# Patient Record
Sex: MALE | Race: WHITE | NOT HISPANIC OR LATINO | URBAN - METROPOLITAN AREA
[De-identification: names, ages, dates, MRNs, and addresses within clinical notes are randomized per-mention and may not be internally consistent; named-entity substitution may affect disease eponyms.]

---

## 2017-08-07 PROBLEM — Z00.00 ENCOUNTER FOR PREVENTIVE HEALTH EXAMINATION: Status: ACTIVE | Noted: 2017-08-07

## 2017-08-29 ENCOUNTER — OUTPATIENT (OUTPATIENT)
Dept: OUTPATIENT SERVICES | Facility: HOSPITAL | Age: 27
LOS: 1 days | End: 2017-08-29

## 2017-08-29 ENCOUNTER — APPOINTMENT (OUTPATIENT)
Dept: MRI IMAGING | Facility: CLINIC | Age: 27
End: 2017-08-29
Payer: COMMERCIAL

## 2017-08-29 DIAGNOSIS — Z00.8 ENCOUNTER FOR OTHER GENERAL EXAMINATION: ICD-10-CM

## 2017-08-29 PROCEDURE — 70551 MRI BRAIN STEM W/O DYE: CPT | Mod: 26

## 2017-10-10 ENCOUNTER — APPOINTMENT (OUTPATIENT)
Dept: NEUROLOGY | Facility: CLINIC | Age: 27
End: 2017-10-10
Payer: COMMERCIAL

## 2017-10-10 VITALS
SYSTOLIC BLOOD PRESSURE: 130 MMHG | DIASTOLIC BLOOD PRESSURE: 86 MMHG | HEIGHT: 69 IN | WEIGHT: 145 LBS | BODY MASS INDEX: 21.48 KG/M2

## 2017-10-10 PROCEDURE — 99205 OFFICE O/P NEW HI 60 MIN: CPT

## 2017-10-10 PROCEDURE — 70551 MRI BRAIN STEM W/O DYE: CPT

## 2017-10-14 ENCOUNTER — APPOINTMENT (OUTPATIENT)
Dept: CT IMAGING | Facility: CLINIC | Age: 27
End: 2017-10-14
Payer: COMMERCIAL

## 2017-10-14 ENCOUNTER — OUTPATIENT (OUTPATIENT)
Dept: OUTPATIENT SERVICES | Facility: HOSPITAL | Age: 27
LOS: 1 days | End: 2017-10-14
Payer: COMMERCIAL

## 2017-10-14 DIAGNOSIS — Z00.8 ENCOUNTER FOR OTHER GENERAL EXAMINATION: ICD-10-CM

## 2017-10-14 PROCEDURE — 70486 CT MAXILLOFACIAL W/O DYE: CPT | Mod: 26

## 2017-10-14 PROCEDURE — 70486 CT MAXILLOFACIAL W/O DYE: CPT

## 2018-03-10 ENCOUNTER — EMERGENCY (EMERGENCY)
Facility: HOSPITAL | Age: 28
LOS: 1 days | Discharge: HOME | End: 2018-03-10
Attending: EMERGENCY MEDICINE | Admitting: EMERGENCY MEDICINE
Payer: COMMERCIAL

## 2018-03-10 VITALS
SYSTOLIC BLOOD PRESSURE: 142 MMHG | OXYGEN SATURATION: 100 % | RESPIRATION RATE: 14 BRPM | HEART RATE: 88 BPM | TEMPERATURE: 98 F | DIASTOLIC BLOOD PRESSURE: 75 MMHG

## 2018-03-10 DIAGNOSIS — G93.5 COMPRESSION OF BRAIN: ICD-10-CM

## 2018-03-10 LAB
ALBUMIN SERPL ELPH-MCNC: 4.6 G/DL — SIGNIFICANT CHANGE UP (ref 3.3–5)
ALP SERPL-CCNC: 67 U/L — SIGNIFICANT CHANGE UP (ref 40–120)
ALT FLD-CCNC: 9 U/L — SIGNIFICANT CHANGE UP (ref 4–41)
APTT BLD: 32.7 SEC — SIGNIFICANT CHANGE UP (ref 27.5–37.4)
AST SERPL-CCNC: 24 U/L — SIGNIFICANT CHANGE UP (ref 4–40)
BASOPHILS # BLD AUTO: 0.03 K/UL — SIGNIFICANT CHANGE UP (ref 0–0.2)
BASOPHILS NFR BLD AUTO: 0.4 % — SIGNIFICANT CHANGE UP (ref 0–2)
BILIRUB SERPL-MCNC: 0.4 MG/DL — SIGNIFICANT CHANGE UP (ref 0.2–1.2)
BUN SERPL-MCNC: 15 MG/DL — SIGNIFICANT CHANGE UP (ref 7–23)
CALCIUM SERPL-MCNC: 8.9 MG/DL — SIGNIFICANT CHANGE UP (ref 8.4–10.5)
CHLORIDE SERPL-SCNC: 105 MMOL/L — SIGNIFICANT CHANGE UP (ref 98–107)
CO2 SERPL-SCNC: 29 MMOL/L — SIGNIFICANT CHANGE UP (ref 22–31)
CREAT SERPL-MCNC: 0.88 MG/DL — SIGNIFICANT CHANGE UP (ref 0.5–1.3)
EOSINOPHIL # BLD AUTO: 0.17 K/UL — SIGNIFICANT CHANGE UP (ref 0–0.5)
EOSINOPHIL NFR BLD AUTO: 2.4 % — SIGNIFICANT CHANGE UP (ref 0–6)
GLUCOSE SERPL-MCNC: 92 MG/DL — SIGNIFICANT CHANGE UP (ref 70–99)
HCT VFR BLD CALC: 37.9 % — LOW (ref 39–50)
HGB BLD-MCNC: 13.6 G/DL — SIGNIFICANT CHANGE UP (ref 13–17)
IMM GRANULOCYTES # BLD AUTO: 0.02 # — SIGNIFICANT CHANGE UP
IMM GRANULOCYTES NFR BLD AUTO: 0.3 % — SIGNIFICANT CHANGE UP (ref 0–1.5)
INR BLD: 1.35 — HIGH (ref 0.88–1.17)
LYMPHOCYTES # BLD AUTO: 2.29 K/UL — SIGNIFICANT CHANGE UP (ref 1–3.3)
LYMPHOCYTES # BLD AUTO: 33 % — SIGNIFICANT CHANGE UP (ref 13–44)
MCHC RBC-ENTMCNC: 31.4 PG — SIGNIFICANT CHANGE UP (ref 27–34)
MCHC RBC-ENTMCNC: 35.9 % — SIGNIFICANT CHANGE UP (ref 32–36)
MCV RBC AUTO: 87.5 FL — SIGNIFICANT CHANGE UP (ref 80–100)
MONOCYTES # BLD AUTO: 0.47 K/UL — SIGNIFICANT CHANGE UP (ref 0–0.9)
MONOCYTES NFR BLD AUTO: 6.8 % — SIGNIFICANT CHANGE UP (ref 2–14)
NEUTROPHILS # BLD AUTO: 3.96 K/UL — SIGNIFICANT CHANGE UP (ref 1.8–7.4)
NEUTROPHILS NFR BLD AUTO: 57.1 % — SIGNIFICANT CHANGE UP (ref 43–77)
NRBC # FLD: 0 — SIGNIFICANT CHANGE UP
PLATELET # BLD AUTO: 184 K/UL — SIGNIFICANT CHANGE UP (ref 150–400)
PMV BLD: 10.4 FL — SIGNIFICANT CHANGE UP (ref 7–13)
POTASSIUM SERPL-MCNC: 3.8 MMOL/L — SIGNIFICANT CHANGE UP (ref 3.5–5.3)
POTASSIUM SERPL-SCNC: 3.8 MMOL/L — SIGNIFICANT CHANGE UP (ref 3.5–5.3)
PROT SERPL-MCNC: 6.9 G/DL — SIGNIFICANT CHANGE UP (ref 6–8.3)
PROTHROM AB SERPL-ACNC: 15.1 SEC — HIGH (ref 9.8–13.1)
RBC # BLD: 4.33 M/UL — SIGNIFICANT CHANGE UP (ref 4.2–5.8)
RBC # FLD: 12.2 % — SIGNIFICANT CHANGE UP (ref 10.3–14.5)
SODIUM SERPL-SCNC: 145 MMOL/L — SIGNIFICANT CHANGE UP (ref 135–145)
WBC # BLD: 6.94 K/UL — SIGNIFICANT CHANGE UP (ref 3.8–10.5)
WBC # FLD AUTO: 6.94 K/UL — SIGNIFICANT CHANGE UP (ref 3.8–10.5)

## 2018-03-10 PROCEDURE — 70450 CT HEAD/BRAIN W/O DYE: CPT | Mod: 26

## 2018-03-10 PROCEDURE — 71045 X-RAY EXAM CHEST 1 VIEW: CPT | Mod: 26

## 2018-03-10 PROCEDURE — 99220: CPT

## 2018-03-10 PROCEDURE — 72156 MRI NECK SPINE W/O & W/DYE: CPT | Mod: 26

## 2018-03-10 PROCEDURE — 70553 MRI BRAIN STEM W/O & W/DYE: CPT | Mod: 26

## 2018-03-10 PROCEDURE — 99281 EMR DPT VST MAYX REQ PHY/QHP: CPT

## 2018-03-10 NOTE — ED PROVIDER NOTE - ATTENDING CONTRIBUTION TO CARE
26yo M PMH: Chiari I malformation dx this past summer referred to ED for MRI brain by neurosurgery. Patient reports intermittent  left-sided paresthesias LUE > LLE, now with new tongue tingling sensation, discussed with his neurosurgeon and referred for evaluation MRI/neurology consult, possible acute stroke.   On exam awake & alert, NAD., PERRL, ?horizontal nystagmus, mmm, lungs CTAB, RRR, 2+ pulses b/l, neuro A&Ox3, no focal deficits, skin warm and dry no rash

## 2018-03-10 NOTE — CONSULT NOTE ADULT - SUBJECTIVE AND OBJECTIVE BOX
Neurology Consult    Name: MAGDALENE ALDANA    HPI: 26 yo man who presents to [facility] on referral by his private neurosurgeon for new onset tongue tingling, which started today. Patient has had intermittent left UE tingling since August 2017, when he was diagnosed w/ Chiari 1 Malformation (last MRI 8/17: 8mm below ramsey magnum). As per house neurosurgery and ED, patient's private neurologist requested patient repeat MRI head to monitor progression of the Chiari Malformation as well as a MRI C-spine given patient's new onset symptoms. CSF studies have also been requested. Neurology team has been consulted for any further recommendations regarding symptomatology, including any other differential diagnosis.      PMH/PSH:   Chiari I malformation    MEDICATIONS  (home)    Allergies: No Known Allergies    Objective:   Vital Signs Last 24 Hrs  T(C): 36.6 (10 Mar 2018 16:44), Max: 36.6 (10 Mar 2018 16:44)  T(F): 97.8 (10 Mar 2018 16:44), Max: 97.8 (10 Mar 2018 16:44)  HR: 88 (10 Mar 2018 16:44) (88 - 88)  BP: 142/75 (10 Mar 2018 16:44) (142/75 - 142/75)  RR: 14 (10 Mar 2018 16:44) (14 - 14)  SpO2: 100% (10 Mar 2018 16:44) (100% - 100%)    General Exam:   General appearance: No acute distress                   Neurological Exam:  Mental Status: AAOx3, fluent speech, follows commands    Cranial Nerves: EOMI, PERRL, V1-V3 intact, facial symmetry intact, no dysarthria, tongue midline, VFF    Motor: 5/5 throughout. No drift x4    Sensation: Intact to LT throughout    Coordination: FTN intact b/l    Reflexes: 1+ bilateral biceps, brachioradialis, patellar and ankle    Gait: normal and stable.      Labs:  CBC Full  -  ( 10 Mar 2018 19:35 )  WBC Count : 6.94 K/uL  Hemoglobin : 13.6 g/dL  Hematocrit : 37.9 %  Platelet Count - Automated : 184 K/uL  Mean Cell Volume : 87.5 fL  Mean Cell Hemoglobin : 31.4 pg  Mean Cell Hemoglobin Concentration : 35.9 %  Auto Neutrophil # : 3.96 K/uL  Auto Lymphocyte # : 2.29 K/uL  Auto Monocyte # : 0.47 K/uL  Auto Eosinophil # : 0.17 K/uL  Auto Basophil # : 0.03 K/uL  Auto Neutrophil % : 57.1 %  Auto Lymphocyte % : 33.0 %  Auto Monocyte % : 6.8 %  Auto Eosinophil % : 2.4 %  Auto Basophil % : 0.4 %    Radiology Neurology Consult    Name: MAGDALENE ALDANA    HPI: 26 yo man who presents to [facility] on referral by his private neurosurgeon for new onset tongue tingling, which started today. Patient has had intermittent left UE tingling since August 2017, when he was diagnosed w/ Chiari 1 Malformation (last MRI 8/17: 8mm below ramsey magnum). As per house neurosurgery and ED, patient's private neurologist requested patient repeat MRI head to monitor progression of the Chiari Malformation as well as a MRI C-spine given patient's new onset symptoms. CSF studies have also been requested. Neurology team has been consulted for any further recommendations regarding symptomatology, including any other differential diagnosis.      PMH/PSH:   Chiari I malformation    MEDICATIONS  (home)    Allergies: No Known Allergies    Objective:   Vital Signs Last 24 Hrs  T(C): 36.6 (10 Mar 2018 16:44), Max: 36.6 (10 Mar 2018 16:44)  T(F): 97.8 (10 Mar 2018 16:44), Max: 97.8 (10 Mar 2018 16:44)  HR: 88 (10 Mar 2018 16:44) (88 - 88)  BP: 142/75 (10 Mar 2018 16:44) (142/75 - 142/75)  RR: 14 (10 Mar 2018 16:44) (14 - 14)  SpO2: 100% (10 Mar 2018 16:44) (100% - 100%)    General Exam:   General appearance: No acute distress                   Neurological Exam:  Mental Status: AAOx3, fluent speech, follows commands    Cranial Nerves: EOMI, PERRL, V1-V3 intact, facial symmetry intact, no dysarthria, tongue midline, VFF    Motor: 5/5 throughout. No drift x4    Sensation: Intact to LT throughout    Coordination: FTN intact b/l    Reflexes: 1+ bilateral biceps, brachioradialis, patellar and ankle    Gait: normal and stable.      Labs:  CBC Full  -  ( 10 Mar 2018 19:35 )  WBC Count : 6.94 K/uL  Hemoglobin : 13.6 g/dL  Hematocrit : 37.9 %  Platelet Count - Automated : 184 K/uL  Mean Cell Volume : 87.5 fL  Mean Cell Hemoglobin : 31.4 pg  Mean Cell Hemoglobin Concentration : 35.9 %  Auto Neutrophil # : 3.96 K/uL  Auto Lymphocyte # : 2.29 K/uL  Auto Monocyte # : 0.47 K/uL  Auto Eosinophil # : 0.17 K/uL  Auto Basophil # : 0.03 K/uL  Auto Neutrophil % : 57.1 %  Auto Lymphocyte % : 33.0 %  Auto Monocyte % : 6.8 %  Auto Eosinophil % : 2.4 %  Auto Basophil % : 0.4 %    Radiology  < from: CT Head No Cont (03.10.18 @ 20:25) >  IMPRESSION:  Unchanged inferior displacement of the cerebellar tonsils below the   foramen magnum compatible with patient's history of Chiari I malformation.    No acute intracranial hemorrhage, mass effect, or CT evidence of an acute   large vascular territorial infarct. MRI is more sensitive in detecting   early changes of ischemia if clinically indicated and there are no   contraindications.    < end of copied text > Neurology Consult    Name: MAGDALENE ALDANA    HPI: 28 yo man who presents to [facility] on referral by his private neurosurgeon for new onset tongue tingling, which started today. Patient has had intermittent left UE tingling since August 2017, when he was diagnosed w/ Chiari 1 Malformation (last MRI 8/17: 8mm below ramsey magnum). As per house neurosurgery and ED, patient's private neurologist requested patient repeat MRI head to monitor progression of the Chiari Malformation as well as a MRI C-spine given patient's new onset symptoms. CSF studies have also been requested. Neurology team has been consulted for any further recommendations regarding symptomatology, including any other differential diagnosis.      PMH/PSH:   Chiari I malformation    MEDICATIONS  (home)    Allergies: No Known Allergies    Objective:   Vital Signs Last 24 Hrs  T(C): 36.6 (10 Mar 2018 16:44), Max: 36.6 (10 Mar 2018 16:44)  T(F): 97.8 (10 Mar 2018 16:44), Max: 97.8 (10 Mar 2018 16:44)  HR: 88 (10 Mar 2018 16:44) (88 - 88)  BP: 142/75 (10 Mar 2018 16:44) (142/75 - 142/75)  RR: 14 (10 Mar 2018 16:44) (14 - 14)  SpO2: 100% (10 Mar 2018 16:44) (100% - 100%)    General Exam:   General appearance: No acute distress                   Neurological Exam:  Mental Status: AAOx3, fluent speech, follows commands    Cranial Nerves: EOMI, PERRL, V1-V3 intact, facial symmetry intact, no dysarthria, tongue midline, VFF    Motor: 5/5 throughout. No drift x4    Sensation: Intact to LT/pinprick throughout    Coordination: FTN intact b/l    Reflexes: 1+ bilateral biceps, brachioradialis, patellar and ankle. Babinski absent b/l (toes downgoing)     Gait: normal and stable.      Labs:  CBC Full  -  ( 10 Mar 2018 19:35 )  WBC Count : 6.94 K/uL  Hemoglobin : 13.6 g/dL  Hematocrit : 37.9 %  Platelet Count - Automated : 184 K/uL  Mean Cell Volume : 87.5 fL  Mean Cell Hemoglobin : 31.4 pg  Mean Cell Hemoglobin Concentration : 35.9 %  Auto Neutrophil # : 3.96 K/uL  Auto Lymphocyte # : 2.29 K/uL  Auto Monocyte # : 0.47 K/uL  Auto Eosinophil # : 0.17 K/uL  Auto Basophil # : 0.03 K/uL  Auto Neutrophil % : 57.1 %  Auto Lymphocyte % : 33.0 %  Auto Monocyte % : 6.8 %  Auto Eosinophil % : 2.4 %  Auto Basophil % : 0.4 %    Radiology  < from: CT Head No Cont (03.10.18 @ 20:25) >  IMPRESSION:  Unchanged inferior displacement of the cerebellar tonsils below the   foramen magnum compatible with patient's history of Chiari I malformation.    No acute intracranial hemorrhage, mass effect, or CT evidence of an acute   large vascular territorial infarct. MRI is more sensitive in detecting   early changes of ischemia if clinically indicated and there are no   contraindications.    < end of copied text > Neurology Consult    Name: MAGDALENE ALDANA    HPI: 26 yo man who presents to [facility] on referral by his private neurosurgeon for new onset tongue tingling, which started today. Patient has had intermittent left UE tingling since August 2017, when he was diagnosed w/ Chiari 1 Malformation (last MRI 8/17: 8mm below ramsey magnum). As per house neurosurgery and ED, patient's private neurologist requested patient repeat MRI head to monitor progression of the Chiari Malformation as well as a MRI C-spine given patient's new onset symptoms. CSF studies have also been requested. Neurology team has been consulted for any further recommendations regarding symptomatology, including any other differential diagnosis.      PMH/PSH:   Chiari I malformation  Sinusitis s/p surgery     MEDICATIONS  (home)    Allergies: No Known Allergies    Objective:   Vital Signs Last 24 Hrs  T(C): 36.6 (10 Mar 2018 16:44), Max: 36.6 (10 Mar 2018 16:44)  T(F): 97.8 (10 Mar 2018 16:44), Max: 97.8 (10 Mar 2018 16:44)  HR: 88 (10 Mar 2018 16:44) (88 - 88)  BP: 142/75 (10 Mar 2018 16:44) (142/75 - 142/75)  RR: 14 (10 Mar 2018 16:44) (14 - 14)  SpO2: 100% (10 Mar 2018 16:44) (100% - 100%)    General Exam:   General appearance: No acute distress                   Neurological Exam:  Mental Status: AAOx3, fluent speech, follows commands    Cranial Nerves: EOMI, PERRL, V1-V3 intact, facial symmetry intact, no dysarthria, tongue midline, VFF    Motor: 5/5 throughout. No drift x4    Sensation: Intact to LT/pinprick throughout    Coordination: FTN intact b/l    Reflexes: 1+ bilateral biceps, brachioradialis, patellar and ankle. Babinski absent b/l (toes downgoing)     Gait: normal and stable.      Labs:  CBC Full  -  ( 10 Mar 2018 19:35 )  WBC Count : 6.94 K/uL  Hemoglobin : 13.6 g/dL  Hematocrit : 37.9 %  Platelet Count - Automated : 184 K/uL  Mean Cell Volume : 87.5 fL  Mean Cell Hemoglobin : 31.4 pg  Mean Cell Hemoglobin Concentration : 35.9 %  Auto Neutrophil # : 3.96 K/uL  Auto Lymphocyte # : 2.29 K/uL  Auto Monocyte # : 0.47 K/uL  Auto Eosinophil # : 0.17 K/uL  Auto Basophil # : 0.03 K/uL  Auto Neutrophil % : 57.1 %  Auto Lymphocyte % : 33.0 %  Auto Monocyte % : 6.8 %  Auto Eosinophil % : 2.4 %  Auto Basophil % : 0.4 %    Radiology  < from: CT Head No Cont (03.10.18 @ 20:25) >  IMPRESSION:  Unchanged inferior displacement of the cerebellar tonsils below the   foramen magnum compatible with patient's history of Chiari I malformation.    No acute intracranial hemorrhage, mass effect, or CT evidence of an acute   large vascular territorial infarct. MRI is more sensitive in detecting   early changes of ischemia if clinically indicated and there are no   contraindications.    < end of copied text >

## 2018-03-10 NOTE — ED PROVIDER NOTE - MEDICAL DECISION MAKING DETAILS
27m w Chiari I here for chronic left-sided paresthesias w new tongue paresthesias. Also b/l horiz nystagmus and abnl Romberg. Will CTH; call neuro who sent him here for collateral; reassess

## 2018-03-10 NOTE — CONSULT NOTE ADULT - SUBJECTIVE AND OBJECTIVE BOX
26 YO male with Chiari I malformation dx this past summer here today for left-sided paresthesias. Says feels mostly in left upper extremity, rarely LLE. Has felt same thing intermittently since the summer. Today also started to feel paresthesias of tongue.Has intermittent pain in left occiput but no new headache today. Occasional intermittent blurry vision in last few mos but none today. No fever, no neck pain, no recent illness.    WDWN male in NAD  Vital Signs Last 24 Hrs  T(C): 36.6 (10 Mar 2018 16:44), Max: 36.6 (10 Mar 2018 16:44)  T(F): 97.8 (10 Mar 2018 16:44), Max: 97.8 (10 Mar 2018 16:44)  HR: 88 (10 Mar 2018 16:44) (88 - 88)  BP: 142/75 (10 Mar 2018 16:44) (142/75 - 142/75)  BP(mean): --  RR: 14 (10 Mar 2018 16:44) (14 - 14)  SpO2: 100% (10 Mar 2018 16:44) (100% - 100%)    AAO X 3  PERRLA, EOMI  CN 2-12 grossly intact  JOHNSON strength 5/5  DTR 2+ Bilaterally  SILT

## 2018-03-10 NOTE — ED ADULT TRIAGE NOTE - CHIEF COMPLAINT QUOTE
Pt presents with c/o tingling on the left side of his body and the back of his head r/t Chiari malformation. Denies any other complaints.

## 2018-03-10 NOTE — ED PROVIDER NOTE - PROGRESS NOTE DETAILS
Elizabeth Goldberger PGY-1: called pt's neurosurg, Kenrick Suazo, awaiting call back Elizabeth Goldberger PGY-1: d/w Dr. Suazo, who said these sx are new (not chronic), concerned for stroke sx. Requesting neuro consult; sending his neurosurg PA to see pt; would like MR head and neck Elizabeth Goldberger PGY-1: called neuro Milton att: 20:45 Patient signed out to me. 27M h/o Arnold Chiari Malformation h/o itnermitt lue tingling p/w tingling. CT pending, Nsx eval underway, plan if CT non con head negative place in CDU for MRI. Milton att: CT unchanged. Nsx JONNY Donovan aware and amenable CDU for MRI and CSF Study. Nsx notified patient and patient's family they will stay overnight in CDU for addl test. CDU JONNY Tan aware and amenable with CDU accept.

## 2018-03-10 NOTE — CONSULT NOTE ADULT - PROBLEM SELECTOR RECOMMENDATION 9
Plan:  -c/w neuro surgery recommendations as planned (MRI brain, MRI C spine)   -recommend gabapentin 100mg TID for paresthesias   -patient to follow up outpatient neurosurgery for long term management   -further recommendations pending results of imaging and CSFs Patient is currently asymptomatic. Unlikely related to ischemic etiology, possibly although unlikely seizure related, most likely cervical pathology possibly correlating to patient known hx of Chiari I Malformation.   Plan:  -c/w neuro surgery recommendations as planned (MRI brain, MRI C spine)   -recommend gabapentin 100mg TID for paresthesias PRN   -patient to follow up outpatient neurosurgery for long term management   -further recommendations pending results of imaging and CSFs

## 2018-03-10 NOTE — ED PROVIDER NOTE - OBJECTIVE STATEMENT
27m w Chiari I malformation dx this past summer here today for left-sided paresthesias. Says feels mostly in left upper extremity, rarely LLE. Has felt same thing intermittently since the summer. Today also started to feel paresthesias of tongue. Called neurosurg and states that he was told to visit ED "because likely due for another MRI". Has intermittent pain in left occiput but no new headache today. Occasional intermittent blurry vision in last few mos but none today. No fever, no neck pain, no recent illness.

## 2018-03-10 NOTE — CONSULT NOTE ADULT - ASSESSMENT
26 yo man who presents to [facility] on referral by his private neurosurgeon for new onset tongue tingling, which started today. Patient has had intermittent left UE tingling since August 2017, when he was diagnosed w/ Chiari 1 Malformation (last MRI 8/17: 8mm below ramsey magnum). As per house neurosurgery and ED, patient's private neurologist requested patient repeat MRI head to monitor progression of the Chiari Malformation as well as a MRI C-spine given patient's new onset symptoms. CSF studies have also been requested. Neurology team has been consulted for any further recommendations regarding symptomatology, including any other differential diagnosis.

## 2018-03-11 VITALS
HEART RATE: 75 BPM | OXYGEN SATURATION: 97 % | TEMPERATURE: 98 F | RESPIRATION RATE: 14 BRPM | DIASTOLIC BLOOD PRESSURE: 63 MMHG | SYSTOLIC BLOOD PRESSURE: 116 MMHG

## 2018-03-11 LAB — HBA1C BLD-MCNC: 5.1 % — SIGNIFICANT CHANGE UP (ref 4–5.6)

## 2018-03-11 PROCEDURE — 99217: CPT

## 2018-03-11 NOTE — ED CDU PROVIDER DISPOSITION NOTE - ATTENDING CONTRIBUTION TO CARE
28yo M PMH: Chiari I malformation dx this past summer referred to ED for MRI brain by neurosurgery. Patient reports intermittent  left-sided paresthesias LUE > LLE, now with new tongue tingling sensation, discussed with his neurosurgeon and referred for evaluation MRI/neurology consult, possible acute stroke.   On exam awake & alert, NAD., PERRL, ?horizontal nystagmus, mmm, lungs CTAB, RRR, 2+ pulses b/l, neuro A&Ox3, no focal deficits, skin warm and dry no rash.  Evaluated by neurosurgery and neurology  MRI completed - no acute findings  Plan for d/c home today

## 2018-03-11 NOTE — ED CDU PROVIDER INITIAL DAY NOTE - PROGRESS NOTE DETAILS
JONNY Cain: pt NAD, VSS, no acute complaints. Ne events on Tele. Will continue to monitor. MRI - no acute infarcts. Will continue to monitor. Neurosurg in the AM.

## 2018-03-11 NOTE — ED ADULT NURSE NOTE - OBJECTIVE STATEMENT
pt on bed aox3 ambulatory c/o occipital pain since yesterday on and off, today with worsening numbness and tingling sensation on left arm and face.  Dx with Chiari malformation. denies dizziness headache CP SOB palpitation slurred speech blurry vision facial droop

## 2018-03-11 NOTE — ED CDU PROVIDER DISPOSITION NOTE - CLINICAL COURSE
-placed in observation for MRI/MRA head and neck  -evaluated by neurology and neurosurgery  -no acute grady changes during observation  -cleared for d/c home -placed in observation for MRI/MRA head and neck  -evaluated by neurology and neurosurgery  -no acute grady changes during observation  -cleared for d/c home by neurosurgery and neurology

## 2018-03-11 NOTE — ED CDU PROVIDER INITIAL DAY NOTE - ATTENDING CONTRIBUTION TO CARE
26yo M PMH: Chiari I malformation dx this past summer referred to ED for MRI brain by neurosurgery. Patient reports intermittent  left-sided paresthesias LUE > LLE, now with new tongue tingling sensation, discussed with his neurosurgeon and referred for evaluation MRI/neurology consult, possible acute stroke.   On exam awake & alert, NAD., PERRL, ?horizontal nystagmus, mmm, lungs CTAB, RRR, 2+ pulses b/l, neuro A&Ox3, no focal deficits, skin warm and dry no rash.  Evaluated by neurosurgery and neurology  MRI completed - no acute findings  Plan for d/c home today 26yo M PMH: Chiari I malformation dx this past summer referred to ED for MRI brain by neurosurgery. Patient reports intermittent  left-sided paresthesias LUE > LLE, now with new tongue tingling sensation, discussed with his neurosurgeon and referred for evaluation MRI/neurology consult, possible acute stroke.   On exam awake & alert, NAD., PERRL, mmm, lungs CTAB, RRR, 2+ pulses b/l, neuro A&Ox3, no focal deficits, skin warm and dry no rash.  Evaluated by neurosurgery and neurology  MRI completed - no acute findings  Plan for d/c home today

## 2018-03-11 NOTE — ED CDU PROVIDER SUBSEQUENT DAY NOTE - PROGRESS NOTE DETAILS
Patient states he is feeling better. No new complaints  Vs stable  Heart- RRR s1s2 nl Lungs - clear bilaterally abd - soft NT ND extrem - no edema or cyanosis  MRI completed this am. MRI head- Chiari 1 Malformation, MRI cervical Neck - negative. Plan for neurology follow up this am. Continue to monitor.

## 2018-03-11 NOTE — ED CDU PROVIDER INITIAL DAY NOTE - MEDICAL DECISION MAKING DETAILS
27m w Chiari I malformation dx this past summer here today for left upper extremity parasthesias and tongue parasthesias lasting for 15-20 minutes.  Plan: MRI head, neck, neurosurg am

## 2018-03-11 NOTE — ED CDU PROVIDER SUBSEQUENT DAY NOTE - ATTENDING CONTRIBUTION TO CARE
28yo M PMH: Chiari I malformation dx this past summer referred to ED for MRI brain by neurosurgery. Patient reports intermittent  left-sided paresthesias LUE > LLE, now with new tongue tingling sensation, discussed with his neurosurgeon and referred for evaluation MRI/neurology consult, possible acute stroke.   On exam awake & alert, NAD., PERRL, mmm, lungs CTAB, RRR, 2+ pulses b/l, neuro A&Ox3, no focal deficits, skin warm and dry no rash.  Evaluated by neurosurgery and neurology  MRI completed - no acute findings  Plan for d/c home today

## 2018-03-11 NOTE — ED CDU PROVIDER SUBSEQUENT DAY NOTE - HISTORY
Patient is a 27 year old male who presents with  left upper extremity paresthesias.. States it lasted 20 minutes. Also with paresthesias of tongue. Patient with a history of Chiari malformation.  Presents now for evaluation after consulting with PMD Dr Asencio.

## 2018-03-11 NOTE — ED CDU PROVIDER INITIAL DAY NOTE - OBJECTIVE STATEMENT
27m w Chiari I malformation dx this past summer here today for left upper extremity parasthesias and tongue parasthesias lasting for 15-20 minutes. Called neurosurgeon Dr. Asencio and wantd him to come in for an MRI. Endorsing intermittent diplopia for the past 2-3 weeks. Denies head trauma, LOC, n/v/f/c, CP, SOB, abdominal pain, dysuria, hematuria, melena, hematochezia. In the ED pt had CT - WNL sent to CDU for MRI brain and spine. Neurosurg following pt.

## 2019-06-11 PROBLEM — G93.5 COMPRESSION OF BRAIN: Chronic | Status: ACTIVE | Noted: 2018-03-10

## 2019-07-09 ENCOUNTER — APPOINTMENT (OUTPATIENT)
Dept: UROLOGY | Facility: CLINIC | Age: 29
End: 2019-07-09
Payer: COMMERCIAL

## 2019-07-09 VITALS
BODY MASS INDEX: 22.22 KG/M2 | SYSTOLIC BLOOD PRESSURE: 115 MMHG | RESPIRATION RATE: 16 BRPM | HEIGHT: 69 IN | TEMPERATURE: 98.1 F | HEART RATE: 80 BPM | WEIGHT: 150 LBS | DIASTOLIC BLOOD PRESSURE: 78 MMHG

## 2019-07-09 DIAGNOSIS — I86.1 SCROTAL VARICES: ICD-10-CM

## 2019-07-09 PROCEDURE — 99203 OFFICE O/P NEW LOW 30 MIN: CPT

## 2019-07-11 ENCOUNTER — TRANSCRIPTION ENCOUNTER (OUTPATIENT)
Age: 29
End: 2019-07-11

## 2019-07-24 NOTE — PHYSICAL EXAM
[General Appearance - Well Nourished] : well nourished [General Appearance - Well Developed] : well developed [Normal Appearance] : normal appearance [Well Groomed] : well groomed [Edema] : no peripheral edema [General Appearance - In No Acute Distress] : no acute distress [Respiration, Rhythm And Depth] : normal respiratory rhythm and effort [Exaggerated Use Of Accessory Muscles For Inspiration] : no accessory muscle use [Abdomen Soft] : soft [Urethral Meatus] : meatus normal [Abdomen Tenderness] : non-tender [Penis Abnormality] : normal circumcised penis [Urinary Bladder Findings] : the bladder was normal on palpation [Scrotum] : the scrotum was normal [Epididymis] : the epididymides were normal [Testes Tenderness] : no tenderness of the testes [] : no rash [No Focal Deficits] : no focal deficits [Sensation] : the sensory exam was normal to light touch and pinprick [Oriented To Time, Place, And Person] : oriented to person, place, and time [Affect] : the affect was normal [Mood] : the mood was normal [Not Anxious] : not anxious [No Palpable Adenopathy] : no palpable adenopathy [FreeTextEntry1] : Left varicocele noted on exam

## 2019-07-24 NOTE — HISTORY OF PRESENT ILLNESS
[FreeTextEntry1] : Patient present for primary evaluation of Left Varicocele.\par He has noted a left Varicocele for the past year that has some intermittent dull aching pain.  \par Seen prior by another urologist that treated him for epididymitis and obtained and US of testis that noted a left Varicocele.\par \par He denies any  trauma or issues.  No family history of  malignances. Denies Hematuria, dysuria, flank pain, fever, and chills.

## 2019-07-24 NOTE — ASSESSMENT
[FreeTextEntry1] : Left Varicocele with prior US at [facility].\par \par Reassured patient and instructed to wear tight fitting undergarments and OTC pain medication for dull ache and if he would like further evaluation for fertility or discuss surgical options gave information to be seen by Dr Ortiz. \par \par Follow up PRN

## 2019-12-14 ENCOUNTER — APPOINTMENT (OUTPATIENT)
Dept: MRI IMAGING | Facility: IMAGING CENTER | Age: 29
End: 2019-12-14
Payer: COMMERCIAL

## 2019-12-14 ENCOUNTER — OUTPATIENT (OUTPATIENT)
Dept: OUTPATIENT SERVICES | Facility: HOSPITAL | Age: 29
LOS: 1 days | End: 2019-12-14
Payer: COMMERCIAL

## 2019-12-14 DIAGNOSIS — Z00.8 ENCOUNTER FOR OTHER GENERAL EXAMINATION: ICD-10-CM

## 2019-12-14 PROCEDURE — 70553 MRI BRAIN STEM W/O & W/DYE: CPT

## 2019-12-14 PROCEDURE — A9585: CPT

## 2019-12-14 PROCEDURE — 70553 MRI BRAIN STEM W/O & W/DYE: CPT | Mod: 26

## 2020-01-14 ENCOUNTER — OUTPATIENT (OUTPATIENT)
Dept: OUTPATIENT SERVICES | Facility: HOSPITAL | Age: 30
LOS: 1 days | End: 2020-01-14
Payer: COMMERCIAL

## 2020-01-14 ENCOUNTER — APPOINTMENT (OUTPATIENT)
Age: 30
End: 2020-01-14
Payer: COMMERCIAL

## 2020-01-14 DIAGNOSIS — Z00.8 ENCOUNTER FOR OTHER GENERAL EXAMINATION: ICD-10-CM

## 2020-01-14 PROCEDURE — 70486 CT MAXILLOFACIAL W/O DYE: CPT | Mod: 26

## 2020-01-14 PROCEDURE — 70486 CT MAXILLOFACIAL W/O DYE: CPT

## 2020-03-11 ENCOUNTER — APPOINTMENT (OUTPATIENT)
Dept: OTOLARYNGOLOGY | Facility: CLINIC | Age: 30
End: 2020-03-11
Payer: COMMERCIAL

## 2020-03-11 VITALS
SYSTOLIC BLOOD PRESSURE: 115 MMHG | HEART RATE: 62 BPM | WEIGHT: 150 LBS | DIASTOLIC BLOOD PRESSURE: 73 MMHG | HEIGHT: 69 IN | BODY MASS INDEX: 22.22 KG/M2

## 2020-03-11 PROCEDURE — 99204 OFFICE O/P NEW MOD 45 MIN: CPT | Mod: 25

## 2020-03-11 PROCEDURE — 31231 NASAL ENDOSCOPY DX: CPT

## 2020-03-11 NOTE — REVIEW OF SYSTEMS
[Post Nasal Drip] : post nasal drip [Dizziness] : dizziness [Vertigo] : vertigo [Lightheadedness] : lightheadedness [Recurrent Sinus Infections] : recurrent sinus infections [Sinus Pain] : sinus pain [Sinus Pressure] : sinus pressure [Negative] : Heme/Lymph

## 2020-03-11 NOTE — REASON FOR VISIT
[Initial Consultation] : an initial consultation for [FreeTextEntry2] : referred by Dr. Kenrick Suazo, Neurologist, for recurrent headaches, CT and MRI findings

## 2020-03-11 NOTE — HISTORY OF PRESENT ILLNESS
[de-identified] : 29 year old male referred by Dr. Kenrick Suazo, Neurologist, for recurrent headaches, CT and MRI findings.  States went to Dr. Suazo for recurrent headaches. Denies nasal congestion, sinus pain, sinus pressure, post nasal drip, nasal discharge.  Denies sinus infections in the past year.   History of Chiari malformation, no treatment required.  MRI 12/14/19 impression tonsillar ectopia with the cerebellar tonsils lying approximately a centimeter below the foramen magnum consistent with a Chiari I malformation.  no hydrocephalus.  No evidence of tectal lesion.  Prominent nasopharyngeal lymphoid tissue at the level of the adenoids which is seen on the prior 10/29/19 as well but not seen 3/10/18.  \par CT sinuses 1/14/2020 impression: moderate to severe nonspecific enlargement of the adenoids of the nasopharynx is noted, grossly unchanged compared to the 12/14/19 brain MRI study.  The adenoids are substantially larger in size compared to the 2017 sinus CT study.  Correlate for adenoidal hypertrophy versus a nasopharyngeal mass. Diffuse paranasal sinus opacification is present as described most consistent with sinusitis.  Nonspecific soft tissue involves both nasal cavities; correlate for possible nasal polyposis.

## 2020-03-11 NOTE — CONSULT LETTER
[Dear  ___] : Dear  [unfilled], [Consult Letter:] : I had the pleasure of evaluating your patient, [unfilled]. [Please see my note below.] : Please see my note below. [Consult Closing:] : Thank you very much for allowing me to participate in the care of this patient.  If you have any questions, please do not hesitate to contact me. [Sincerely,] : Sincerely, [FreeTextEntry3] : Inocencio Hamilton MD, AURELIA, FACS\par  Department Otolaryngology\par Director of [facility]\par Professor of Otolaryngology, \par [employer] Cordero/[facility] [employer]\par

## 2020-03-11 NOTE — PROCEDURE
[Image(s) Captured] : image(s) captured and filed [Topical Lidocaine] : topical lidocaine [Oxymetazoline HCl] : oxymetazoline HCl [Flexible Endoscope] : examined with the flexible endoscope [Serial Number: ___] : Serial Number: [unfilled] [Recalcitrant Symptoms] : recalcitrant symptoms  [Anatomical Abnormality] : anatomical abnormality [Anterior rhinoscopy insufficient to account for symptoms] : anterior rhinoscopy insufficient to account for symptoms [FreeTextEntry6] : Pre-op indication(s): Headache, \par Post-op indication(s): Deviated septum chronic sinusitis\par Verbal consent obtained from patient.\par “Anterior rhinoscopy insufficient to account for symptoms” \par Details for procedure: \par Scope #: 129\par Type of scope:    flexible fiber optic telescope X    Rigid glass telescope \par Anesthesia and/or vasoconstriction was achieved topically by using: \par 4% Lidocaine spray   0.05% Oxymetazoline     Other ______ \par The following anatomic sites were directly examined in a sequential fashion: \par The scope was introduced in the nasal passage between the middle and inferior turbinates to exam the inferior portion of the middle meatus and the fontanelle, as well as the maxillary ostia. Next, the scope was passed medially and posteriorly to the middle turbinates to examine the sphenoethmoid recess and the superior turbinate region. \par Upon visualization the finders are as follows: \par Nasal Septum:   Normal    Deviated to   left Superiorly   right Septum closing off the posterior one half the nose and drainage catheter  \par Bleeding site cauterized:    Anterior   left   right   Posterior   left   right \par Method:   Silver Nitrate   YAG Laser    Electrocautery ______ \par Right Side: \par * Mucosa: Normal\par * Mucous: Normal\par * Polyp: Normal\par * Inferior Turbinate: Normal\par * Middle Turbinate: Polypoid and congestive\par * Superior Turbinate: Normal\par * Inferior Meatus: Normal\par * Middle Meatus: Normal\par * Super Meatus: Normal\par * Sphenoethmoidal Recess: Normal\par Left Side: \par * Mucosa: Normal\par * Mucous: Normal\par * Polyp: Normal\par * Inferior Turbinate: Normal\par * Middle Turbinate: Polypoid and thickened\par * Superior Turbinate: Normal\par * Inferior Meatus: Normal\par * Middle Meatus: closed\par * Super Meatus: Normal\par * Sphenoethmoidal Recess: Normal\par The patient tolerated the procedure well without any complications.\par \par \par

## 2020-05-06 ENCOUNTER — APPOINTMENT (OUTPATIENT)
Dept: OTOLARYNGOLOGY | Facility: CLINIC | Age: 30
End: 2020-05-06
Payer: COMMERCIAL

## 2020-05-06 VITALS
SYSTOLIC BLOOD PRESSURE: 106 MMHG | HEART RATE: 76 BPM | WEIGHT: 150 LBS | BODY MASS INDEX: 22.22 KG/M2 | HEIGHT: 69 IN | DIASTOLIC BLOOD PRESSURE: 71 MMHG | TEMPERATURE: 98 F

## 2020-05-06 VITALS
SYSTOLIC BLOOD PRESSURE: 125 MMHG | HEIGHT: 69 IN | TEMPERATURE: 98.3 F | HEART RATE: 85 BPM | BODY MASS INDEX: 22.22 KG/M2 | WEIGHT: 150 LBS | DIASTOLIC BLOOD PRESSURE: 75 MMHG

## 2020-05-06 DIAGNOSIS — R04.0 EPISTAXIS: ICD-10-CM

## 2020-05-06 PROCEDURE — 99215 OFFICE O/P EST HI 40 MIN: CPT | Mod: 25

## 2020-05-06 PROCEDURE — 31231 NASAL ENDOSCOPY DX: CPT

## 2020-05-06 NOTE — HISTORY OF PRESENT ILLNESS
[de-identified] : 29 year old male presents to office with nasal irritation. Pt has history of recent cold, treated with antibiotics by PCP. Pt went to an urgent care center because symptom had worsened was COVID tested ( negative results) with nasal swab which cause severe nasal irritation and moderate bleeding, pt was cough up blood. All symptoms have no resolved however pt would like to have nasal cavity is now okay before up coming surgery 5/18/20. Pt continues to use nasal saline prn. [FreeTextEntry2] : Bloody discharge

## 2020-05-06 NOTE — PHYSICAL EXAM
[Nasal Endoscopy Performed] : nasal endoscopy was performed, see procedure section for findings [] : septum deviated bilaterally [de-identified] : swollen [de-identified] : Posterior purulent [Midline] : trachea located in midline position [Normal] : no rashes

## 2020-05-06 NOTE — PROCEDURE
[Image(s) Captured] : image(s) captured and filed [Topical Lidocaine] : topical lidocaine [Oxymetazoline HCl] : oxymetazoline HCl [Flexible Endoscope] : examined with the flexible endoscope [Serial Number: ___] : Serial Number: [unfilled] [Recalcitrant Symptoms] : recalcitrant symptoms  [Anatomical Abnormality] : anatomical abnormality [Epistaxis] : evaluation of epistaxis [Anterior rhinoscopy insufficient to account for symptoms] : anterior rhinoscopy insufficient to account for symptoms [Congested] : congested [Jorge] : jorge [___ % Obstructed] : [unfilled]U% obstructed [S-Shaped Deviated] : S-shape deviation [Nasal Septum] : no lesions [Nasal Septum Mucosa Bleeding] : no bleeding [FreeTextEntry6] : Pre-op indication(s): Epistaxis\par Post-op indication(s): Deviated septum and nasopharyngeal tissue\par Verbal consent obtained from patient.\par “Anterior rhinoscopy insufficient to account for symptoms” \par Details for procedure: \par Scope #: 118\par Type of scope:    flexible fiber optic telescope  X   Rigid glass telescope \par Anesthesia and/or vasoconstriction was achieved topically by using: \par 4% Lidocaine spray   0.05% Oxymetazoline     Other ______ \par The following anatomic sites were directly examined in a sequential fashion: \par The scope was introduced in the nasal passage between the middle and inferior turbinates to exam the inferior portion of the middle meatus and the fontanelle, as well as the maxillary ostia. Next, the scope was passed medially and posteriorly to the middle turbinates to examine the sphenoethmoid recess and the superior turbinate region. \par Upon visualization the finders are as follows: \par Nasal Septum:     Deviated to   left  &  right  \par Bleeding site cauterized:    Anterior   left   right   Posterior   left   right \par Method:   Silver Nitrate   YAG Laser    Electrocautery ______ \par Right Side: \par * Mucosa: Normal\par * Mucous: Normal\par * Polyp: Normal\par * Inferior Turbinate: Normal\par * Middle Turbinate: Normal\par * Superior Turbinate: Normal\par * Inferior Meatus: Normal\par * Middle Meatus: closed\par * Super Meatus: Normal\par * Sphenoethmoidal Recess: swollen tissue\par Left Side: \par * Mucosa: Normal\par * Mucous: Normal\par * Polyp: Normal\par * Inferior Turbinate: Normal\par * Middle Turbinate: Normal\par * Superior Turbinate: Normal\par * Inferior Meatus: Normal\par * Middle Meatus: middle meatus\par * Super Meatus: Normal\par * Sphenoethmoidal Recess: swollen tissue\par The patient tolerated the procedure well without any complications.\par \par \par

## 2020-06-24 ENCOUNTER — APPOINTMENT (OUTPATIENT)
Dept: OTOLARYNGOLOGY | Facility: CLINIC | Age: 30
End: 2020-06-24

## 2020-06-29 DIAGNOSIS — Z01.818 ENCOUNTER FOR OTHER PREPROCEDURAL EXAMINATION: ICD-10-CM

## 2020-06-30 ENCOUNTER — APPOINTMENT (OUTPATIENT)
Dept: DISASTER EMERGENCY | Facility: CLINIC | Age: 30
End: 2020-06-30

## 2020-07-01 LAB — SARS-COV-2 N GENE NPH QL NAA+PROBE: NOT DETECTED

## 2020-07-02 ENCOUNTER — APPOINTMENT (OUTPATIENT)
Dept: OTOLARYNGOLOGY | Facility: AMBULATORY SURGERY CENTER | Age: 30
End: 2020-07-02
Payer: COMMERCIAL

## 2020-07-02 ENCOUNTER — RESULT REVIEW (OUTPATIENT)
Age: 30
End: 2020-07-02

## 2020-07-02 PROCEDURE — 31267 ENDOSCOPY MAXILLARY SINUS: CPT | Mod: 50

## 2020-07-02 PROCEDURE — 61782 SCAN PROC CRANIAL EXTRA: CPT

## 2020-07-02 PROCEDURE — 31255 NSL/SINS NDSC W/TOT ETHMDCT: CPT | Mod: 50

## 2020-07-02 PROCEDURE — 30130 EXCISE INFERIOR TURBINATE: CPT | Mod: 50

## 2020-07-02 PROCEDURE — 30520 REPAIR OF NASAL SEPTUM: CPT

## 2020-07-02 PROCEDURE — 31287 NASAL/SINUS ENDOSCOPY SURG: CPT | Mod: 50,59

## 2020-07-09 ENCOUNTER — APPOINTMENT (OUTPATIENT)
Dept: OTOLARYNGOLOGY | Facility: CLINIC | Age: 30
End: 2020-07-09
Payer: COMMERCIAL

## 2020-07-09 PROCEDURE — 99024 POSTOP FOLLOW-UP VISIT: CPT

## 2020-07-09 NOTE — REASON FOR VISIT
[Post-Operative Visit] : a post-operative visit [FreeTextEntry2] : splint removal [FreeTextEntry1] : s/p septoplasty and turbinectomy s/p Endoscopic Sinus Surgery

## 2020-07-09 NOTE — HISTORY OF PRESENT ILLNESS
[de-identified] : 29 year old female Pt is healing well. Pt admits to using saline diligently throughout the week. Pt has moderate nasal congestion and mild pain.\par

## 2020-11-11 ENCOUNTER — APPOINTMENT (OUTPATIENT)
Dept: OTOLARYNGOLOGY | Facility: CLINIC | Age: 30
End: 2020-11-11
Payer: COMMERCIAL

## 2020-11-11 VITALS
SYSTOLIC BLOOD PRESSURE: 118 MMHG | HEART RATE: 76 BPM | BODY MASS INDEX: 23.7 KG/M2 | DIASTOLIC BLOOD PRESSURE: 75 MMHG | HEIGHT: 69 IN | WEIGHT: 160 LBS

## 2020-11-11 PROCEDURE — 99072 ADDL SUPL MATRL&STAF TM PHE: CPT

## 2020-11-11 PROCEDURE — 31231 NASAL ENDOSCOPY DX: CPT

## 2020-11-11 PROCEDURE — 99214 OFFICE O/P EST MOD 30 MIN: CPT | Mod: 25

## 2020-11-11 RX ORDER — CEPHALEXIN 500 MG/1
500 TABLET ORAL
Qty: 14 | Refills: 0 | Status: DISCONTINUED | COMMUNITY
Start: 2020-07-01 | End: 2020-11-11

## 2020-11-11 RX ORDER — OXYCODONE AND ACETAMINOPHEN 5; 325 MG/1; MG/1
5-325 TABLET ORAL
Qty: 15 | Refills: 0 | Status: DISCONTINUED | COMMUNITY
Start: 2020-07-01 | End: 2020-11-11

## 2020-11-11 RX ORDER — AZITHROMYCIN 250 MG/1
250 TABLET, FILM COATED ORAL
Qty: 1 | Refills: 1 | Status: DISCONTINUED | COMMUNITY
Start: 2020-05-06 | End: 2020-11-11

## 2020-11-11 NOTE — PHYSICAL EXAM
[Nasal Endoscopy Performed] : nasal endoscopy was performed, see procedure section for findings [Midline] : trachea located in midline position [Normal] : no rashes [de-identified] : swollen [de-identified] : left middle meatus

## 2020-11-11 NOTE — PROCEDURE
[Image(s) Captured] : image(s) captured and filed [Video Captured] : video captured and filed [Topical Lidocaine] : topical lidocaine [Oxymetazoline HCl] : oxymetazoline HCl [Flexible Endoscope] : examined with the flexible endoscope [Serial Number: ___] : Serial Number: [unfilled] [Osteomeatal Pathology] : osteomeatal pathology [Recalcitrant Symptoms] : recalcitrant symptoms  [Anterior rhinoscopy insufficient to account for symptoms] : anterior rhinoscopy insufficient to account for symptoms [FreeTextEntry6] : Pre-op indication(s): pressure\par Post-op indication(s): Left sinusitis\par Verbal consent obtained from patient.\par “Anterior rhinoscopy insufficient to account for symptoms” \par Details for procedure: \par Scope #: 146\par Type of scope:    flexible fiber optic telescope   X  Rigid glass telescope \par Anesthesia and/or vasoconstriction was achieved topically by using: \par 4% Lidocaine spray   0.05% Oxymetazoline     Other ______ \par The following anatomic sites were directly examined in a sequential fashion: \par The scope was introduced in the nasal passage between the middle and inferior turbinates to exam the inferior portion of the middle meatus and the fontanelle, as well as the maxillary ostia. Next, the scope was passed medially and posteriorly to the middle turbinates to examine the sphenoethmoid recess and the superior turbinate region. \par Upon visualization the finders are as follows: \par Nasal Septum:   Normal  \par Bleeding site cauterized:    Anterior   left   right   Posterior   left   right \par Method:   Silver Nitrate   YAG Laser    Electrocautery ______ \par Right Side: \par * Mucosa: Normal\par * Mucous: Normal\par * Polyp: Normal\par * Inferior Turbinate: Normal\par * Middle Turbinate: Normal\par * Superior Turbinate: Normal\par * Inferior Meatus: Normal\par * Middle Meatus: Normal\par * Super Meatus: Normal\par * Sphenoethmoidal Recess: Normal\par Left Side: \par * Mucosa: Normal\par * Mucous: Normal\par * Polyp: Normal\par * Inferior Turbinate: Normal\par * Middle Turbinate: Normal\par * Superior Turbinate: Normal\par * Inferior Meatus: Normal\par * Middle Meatus: Purulent discharge.\par * Super Meatus: Normal\par * Sphenoethmoidal Recess: Normal\par The patient tolerated the procedure well without any complications.\par \par \par  [de-identified] : Left MM drainag

## 2020-11-11 NOTE — REASON FOR VISIT
[Subsequent Evaluation] : a subsequent evaluation for [Other: _____] : [unfilled] [FreeTextEntry2] : follow up s/p Septoplasty and turbinectomy

## 2020-11-11 NOTE — HISTORY OF PRESENT ILLNESS
[SMR] : submucous resection (SMR) [Sinus] : sinus surgery [de-identified] : 30 year old male follow up s/p Septoplasty, Turbinectomy and bilateral CT guided endoscopic ethmoidectomy with maxillary antrostomy 7/02/2020, history of deviated septum and chronic sinusitis. Reports doing well since splint removal, however states recently with change in weather, having severe nasal congestion.  States uses [employer] rinses as needed with some relief, pressure/congestion radiates to bilateral ears.  Reports minimal nasal discharge, colorless.  Denies sinus pain/pressure, post nasal drip, recent fevers and sinus infections.  Denies use of OTC allergy medication.

## 2021-08-25 ENCOUNTER — APPOINTMENT (OUTPATIENT)
Dept: OTOLARYNGOLOGY | Facility: CLINIC | Age: 31
End: 2021-08-25
Payer: COMMERCIAL

## 2021-08-25 VITALS
BODY MASS INDEX: 23.7 KG/M2 | HEIGHT: 69 IN | SYSTOLIC BLOOD PRESSURE: 101 MMHG | HEART RATE: 62 BPM | WEIGHT: 160 LBS | DIASTOLIC BLOOD PRESSURE: 61 MMHG

## 2021-08-25 PROCEDURE — 99214 OFFICE O/P EST MOD 30 MIN: CPT | Mod: 25

## 2021-08-25 PROCEDURE — 31231 NASAL ENDOSCOPY DX: CPT

## 2021-08-25 RX ORDER — METHYLPREDNISOLONE 4 MG/1
4 TABLET ORAL
Qty: 1 | Refills: 1 | Status: DISCONTINUED | COMMUNITY
Start: 2020-11-11 | End: 2021-08-25

## 2021-08-25 RX ORDER — ACETAMINOPHEN 325 MG/1
TABLET, FILM COATED ORAL
Refills: 0 | Status: DISCONTINUED | COMMUNITY
End: 2021-08-25

## 2021-08-25 RX ORDER — AMOXICILLIN AND CLAVULANATE POTASSIUM 875; 125 MG/1; MG/1
875-125 TABLET, COATED ORAL
Qty: 20 | Refills: 1 | Status: DISCONTINUED | COMMUNITY
Start: 2020-11-11 | End: 2021-08-25

## 2021-08-25 NOTE — PHYSICAL EXAM
[Nasal Endoscopy Performed] : nasal endoscopy was performed, see procedure section for findings [Midline] : trachea located in midline position [Normal] : no rashes [FreeTextEntry1] : Nasal condition improved overall, now has some nasal congestion [de-identified] : swollen [de-identified] : Bilateral middle meatus left greater than right

## 2021-08-25 NOTE — PROCEDURE
[Image(s) Captured] : image(s) captured and filed [Video Captured] : video captured and filed [Topical Lidocaine] : topical lidocaine [Oxymetazoline HCl] : oxymetazoline HCl [Flexible Endoscope] : examined with the flexible endoscope [Serial Number: ___] : Serial Number: [unfilled] [FreeTextEntry6] : Pre-op indication(s): recurrent nasal congestion\par Post-op indication(s): recurrent nasal polyps\par Verbal consent obtained from patient.\par “Anterior rhinoscopy insufficient to account for symptoms” \par Details for procedure: \par Scope #: 203\par Type of scope:    flexible fiber optic telescope  X   Rigid glass telescope \par Anesthesia and/or vasoconstriction was achieved topically by using: \par 4% Lidocaine spray   0.05% Oxymetazoline     Other ______ \par The following anatomic sites were directly examined in a sequential fashion: \par The scope was introduced in the nasal passage between the middle and inferior turbinates to exam the inferior portion of the middle meatus and the fontanelle, as well as the maxillary ostia. Next, the scope was passed medially and posteriorly to the middle turbinates to examine the sphenoethmoid recess and the superior turbinate region. \par Upon visualization the finders are as follows: \par Nasal Septum:   Normal   \par Bleeding site cauterized:    Anterior   left   right   Posterior   left   right \par Method:   Silver Nitrate   YAG Laser    Electrocautery ______ \par Right Side: \par * Mucosa: Normal\par * Mucous: Normal\par * Polyp: filling middle meatus\par * Inferior Turbinate: Normal\par * Middle Turbinate: Normal\par * Superior Turbinate: Normal\par * Inferior Meatus: Normal\par * Middle Meatus: Normal\par * Super Meatus: Normal\par * Sphenoethmoidal Recess: Normal\par Left Side: \par * Mucosa: Normal\par * Mucous: Normal\par * Polyp: filling the middle meatus\par * Inferior Turbinate: Normal\par * Middle Turbinate: Normal\par * Superior Turbinate: Normal\par * Inferior Meatus: Normal\par * Middle Meatus: polyps\par * Super Meatus: Normal\par * Sphenoethmoidal Recess: Normal\par The patient tolerated the procedure well without any complications.\par \par \par

## 2021-08-25 NOTE — REASON FOR VISIT
[Subsequent Evaluation] : a subsequent evaluation for [FreeTextEntry2] : s/p Septoplasty, Turbinectomy and bilateral CT guided endoscopic ethmoidectomy with maxillary antrostomy 7/02/2020

## 2021-08-25 NOTE — HISTORY OF PRESENT ILLNESS
[de-identified] : 31 year old male follow up s/p Septoplasty, Turbinectomy and bilateral CT guided endoscopic ethmoidectomy with maxillary antrostomy 7/02/2020, history of deviated septum and chronic sinusitis. Reports intermittent nasal congestion and post nasal drip. Reports occasional use of nasal saline as needed with temporary relief. Denies nasal discharge, sinus pain/pressure. Denies recent sinus infections

## 2021-09-30 ENCOUNTER — APPOINTMENT (OUTPATIENT)
Dept: MRI IMAGING | Facility: CLINIC | Age: 31
End: 2021-09-30

## 2021-10-15 ENCOUNTER — APPOINTMENT (OUTPATIENT)
Dept: OTOLARYNGOLOGY | Facility: CLINIC | Age: 31
End: 2021-10-15

## 2021-10-20 ENCOUNTER — APPOINTMENT (OUTPATIENT)
Dept: MRI IMAGING | Facility: CLINIC | Age: 31
End: 2021-10-20
Payer: COMMERCIAL

## 2021-10-20 ENCOUNTER — OUTPATIENT (OUTPATIENT)
Dept: OUTPATIENT SERVICES | Facility: HOSPITAL | Age: 31
LOS: 1 days | End: 2021-10-20
Payer: COMMERCIAL

## 2021-10-20 DIAGNOSIS — Z00.00 ENCOUNTER FOR GENERAL ADULT MEDICAL EXAMINATION WITHOUT ABNORMAL FINDINGS: ICD-10-CM

## 2021-10-20 PROCEDURE — 72146 MRI CHEST SPINE W/O DYE: CPT | Mod: 26

## 2021-10-20 PROCEDURE — 72141 MRI NECK SPINE W/O DYE: CPT | Mod: 26

## 2021-10-20 PROCEDURE — 72146 MRI CHEST SPINE W/O DYE: CPT

## 2021-10-20 PROCEDURE — 72141 MRI NECK SPINE W/O DYE: CPT

## 2021-10-20 PROCEDURE — 72148 MRI LUMBAR SPINE W/O DYE: CPT

## 2021-10-20 PROCEDURE — 70551 MRI BRAIN STEM W/O DYE: CPT | Mod: 26

## 2021-10-20 PROCEDURE — 72148 MRI LUMBAR SPINE W/O DYE: CPT | Mod: 26

## 2021-10-20 PROCEDURE — 70551 MRI BRAIN STEM W/O DYE: CPT

## 2021-11-17 ENCOUNTER — APPOINTMENT (OUTPATIENT)
Dept: OTOLARYNGOLOGY | Facility: CLINIC | Age: 31
End: 2021-11-17
Payer: COMMERCIAL

## 2021-11-17 VITALS
WEIGHT: 160 LBS | HEIGHT: 69 IN | SYSTOLIC BLOOD PRESSURE: 144 MMHG | HEART RATE: 65 BPM | DIASTOLIC BLOOD PRESSURE: 82 MMHG | BODY MASS INDEX: 23.7 KG/M2

## 2021-11-17 PROCEDURE — 31231 NASAL ENDOSCOPY DX: CPT

## 2021-11-17 PROCEDURE — 99214 OFFICE O/P EST MOD 30 MIN: CPT | Mod: 25

## 2021-11-17 RX ORDER — MOMETASONE 50 UG/1
50 SPRAY, METERED NASAL DAILY
Qty: 1 | Refills: 5 | Status: COMPLETED | COMMUNITY
Start: 2021-08-25 | End: 2021-11-17

## 2021-11-17 RX ORDER — METHYLPREDNISOLONE 4 MG/1
4 TABLET ORAL
Qty: 1 | Refills: 1 | Status: COMPLETED | COMMUNITY
Start: 2021-08-25 | End: 2021-11-17

## 2021-11-17 NOTE — HISTORY OF PRESENT ILLNESS
[de-identified] : 31 year old male s/p Septoplasty, Turbinectomy and bilateral CT guided endoscopic ethmoidectomy with maxillary antrostomy 7/02/2020. Patient presents with nasal congestion, post nasal drip, sinus pain/ pressure and clear nasal drainage. Denies use of steroid nasal sprays, currently using steam nebulizer. Reports intermittent dizziness for the past month. Reports rare occasions of right tinnitus, lasting seconds. Denies otalgia, otorrhea, changes in hearing.\par \par MR Brain 10/20/2021 Impression: 1) Again noted is a Chiari I Malformation with an otherwise unremarkable MR appearance of the brain outlined above. 2) Extensive and worsening polypoid mucosal disease which fills the maxillary sinuses. Also there is thickening in the ethmoid sphenoid and frontal sinuses. Mastoids are clear.

## 2021-11-17 NOTE — REASON FOR VISIT
[Subsequent Evaluation] : a subsequent evaluation for [FreeTextEntry2] : nasal congestion, sinus pain, dizziness

## 2021-11-17 NOTE — CONSULT LETTER
[Dear  ___] : Dear  [unfilled], [Consult Letter:] : I had the pleasure of evaluating your patient, [unfilled]. [Please see my note below.] : Please see my note below. [Consult Closing:] : Thank you very much for allowing me to participate in the care of this patient.  If you have any questions, please do not hesitate to contact me. [Sincerely,] : Sincerely, [FreeTextEntry2] : Dr. Olga Rascon [FreeTextEntry3] : Inocencio Hamilton MD, AURELIA, FACS\par  Department Otolaryngology\par Director of [facility]\par Professor of Otolaryngology, \par [employer] Cordero/[facility] [employer]

## 2021-11-17 NOTE — DATA REVIEWED
[de-identified] : MRI shows Chiari I malformation no tumor or abnormality,  sinus disease with mucosal thickening.

## 2021-11-17 NOTE — PROCEDURE
[Image(s) Captured] : image(s) captured and filed [Video Captured] : video captured and filed [Topical Lidocaine] : topical lidocaine [Oxymetazoline HCl] : oxymetazoline HCl [Flexible Endoscope] : examined with the flexible endoscope [Serial Number: ___] : Serial Number: [unfilled] [Osteomeatal Pathology] : osteomeatal pathology [Recalcitrant Symptoms] : recalcitrant symptoms  [Anatomical Abnormality] : anatomical abnormality [Anterior rhinoscopy insufficient to account for symptoms] : anterior rhinoscopy insufficient to account for symptoms [Allergic] : allergic signs [Red] : red [Normal] : the septum showed no abnormalities [___ cm] : bilateral [unfilled]Ucm polyp(s) [FreeTextEntry6] : Pre-op indication(s): Recurrent sinusitis and nasal polyposis\par Post-op indication(s): same\par Verbal consent obtained from patient.\par “Anterior rhinoscopy insufficient to account for symptoms” \par Details for procedure: \par Scope #: 202\par Type of scope:    flexible fiber optic telescope X    Rigid glass telescope \par Anesthesia and/or vasoconstriction was achieved topically by using: \par 4% Lidocaine spray   0.05% Oxymetazoline     Other ______ \par The following anatomic sites were directly examined in a sequential fashion: \par The scope was introduced in the nasal passage between the middle and inferior turbinates to exam the inferior portion of the middle meatus and the fontanelle, as well as the maxillary ostia. Next, the scope was passed medially and posteriorly to the middle turbinates to examine the sphenoethmoid recess and the superior turbinate region. \par Upon visualization the finders are as follows: \par Nasal Septum:   Normal \par Bleeding site cauterized:    Anterior   left   right   Posterior   left   right \par Method:   Silver Nitrate   YAG Laser    Electrocautery ______ \par Right Side: \par * Mucosa: Normal\par * Mucous: Normal\par * Polyp: Filling left MM extending almost to floor\par * Inferior Turbinate: Normal\par * Middle Turbinate: Normal\par * Superior Turbinate: Normal\par * Inferior Meatus: Normal\par * Middle Meatus: Normal\par * Super Meatus: Normal\par * Sphenoethmoidal Recess: Normal\par Left Side: \par * Mucosa: Normal\par * Mucous: Normal\par * Polyp: Filling MM almost o floor\par * Inferior Turbinate: Normal\par * Middle Turbinate: Normal\par * Superior Turbinate: Normal\par * Inferior Meatus: Normal\par * Middle Meatus: polyps\par * Super Meatus: Normal\par * Sphenoethmoidal Recess: Normal\par The patient tolerated the procedure well without any complications.\par \par \par  [de-identified] : polyps

## 2022-01-26 ENCOUNTER — APPOINTMENT (OUTPATIENT)
Dept: OTOLARYNGOLOGY | Facility: CLINIC | Age: 32
End: 2022-01-26

## 2022-03-25 ENCOUNTER — APPOINTMENT (OUTPATIENT)
Dept: OTOLARYNGOLOGY | Facility: CLINIC | Age: 32
End: 2022-03-25
Payer: COMMERCIAL

## 2022-03-25 VITALS
DIASTOLIC BLOOD PRESSURE: 73 MMHG | HEART RATE: 55 BPM | BODY MASS INDEX: 22.22 KG/M2 | HEIGHT: 69 IN | SYSTOLIC BLOOD PRESSURE: 116 MMHG | WEIGHT: 150 LBS

## 2022-03-25 DIAGNOSIS — Z78.9 OTHER SPECIFIED HEALTH STATUS: ICD-10-CM

## 2022-03-25 DIAGNOSIS — R51.9 HEADACHE, UNSPECIFIED: ICD-10-CM

## 2022-03-25 DIAGNOSIS — J33.9 NASAL POLYP, UNSPECIFIED: ICD-10-CM

## 2022-03-25 DIAGNOSIS — J32.0 CHRONIC MAXILLARY SINUSITIS: ICD-10-CM

## 2022-03-25 DIAGNOSIS — J32.2 CHRONIC ETHMOIDAL SINUSITIS: ICD-10-CM

## 2022-03-25 PROCEDURE — 99214 OFFICE O/P EST MOD 30 MIN: CPT | Mod: 25

## 2022-03-25 PROCEDURE — 31237 NSL/SINS NDSC SURG BX POLYPC: CPT

## 2022-03-25 RX ORDER — FLUTICASONE PROPIONATE 50 MCG
SPRAY, SUSPENSION NASAL
Refills: 0 | Status: ACTIVE | COMMUNITY

## 2022-03-25 RX ORDER — MOMETASONE 50 UG/1
50 SPRAY, METERED NASAL DAILY
Qty: 1 | Refills: 5 | Status: COMPLETED | COMMUNITY
Start: 2021-11-17 | End: 2022-03-25

## 2022-03-25 RX ORDER — METHYLPREDNISOLONE 4 MG/1
4 TABLET ORAL
Qty: 1 | Refills: 1 | Status: COMPLETED | COMMUNITY
Start: 2021-11-17 | End: 2022-03-25

## 2022-03-25 NOTE — PROCEDURE
[Image(s) Captured] : image(s) captured and filed [Video Captured] : video captured and filed [Topical Lidocaine] : topical lidocaine [Oxymetazoline HCl] : oxymetazoline HCl [Flexible Endoscope] : examined with the flexible endoscope [Serial Number: ___] : Serial Number: [unfilled] [Osteomeatal Pathology] : osteomeatal pathology [Recalcitrant Symptoms] : recalcitrant symptoms  [Debridement] : debridement  [Anatomical Abnormality] : anatomical abnormality [Anterior rhinoscopy insufficient to account for symptoms] : anterior rhinoscopy insufficient to account for symptoms [Normal] : the paranasal sinuses had no abnormalities [FreeTextEntry6] : Pre-op indication(s): Nasal condition improved\par Post-op indication(s): \par Verbal consent obtained from patient.\par “Anterior rhinoscopy insufficient to account for symptoms” \par Details for procedure: \par Scope #: 209\par Type of scope:    flexible fiber optic telescope  X   Rigid glass telescope \par Anesthesia and/or vasoconstriction was achieved topically by using: \par 4% Lidocaine spray   0.05% Oxymetazoline     Other ______ \par The following anatomic sites were directly examined in a sequential fashion: \par The scope was introduced in the nasal passage between the middle and inferior turbinates to exam the inferior portion of the middle meatus and the fontanelle, as well as the maxillary ostia. Next, the scope was passed medially and posteriorly to the middle turbinates to examine the sphenoethmoid recess and the superior turbinate region. \par Upon visualization the finders are as follows: \par Nasal Septum:   Normal    \par Bleeding site cauterized:    Anterior   left   right   Posterior   left   right \par Method:   Silver Nitrate   YAG Laser    Electrocautery ______ \par Right Side: \par * Mucosa: Normal\par * Mucous: Normal\par * Polyp: Normal\par * Inferior Turbinate: Normal\par * Middle Turbinate: Normal\par * Superior Turbinate: Normal\par * Inferior Meatus: Normal\par * Middle Meatus: crust debrided\par * Super Meatus: Normal\par * Sphenoethmoidal Recess: Normal\par Left Side: \par * Mucosa: Normal\par * Mucous: Normal\par * Polyp: Normal\par * Inferior Turbinate: Normal\par * Middle Turbinate: Normal\par * Superior Turbinate: Normal\par * Inferior Meatus: Normal\par * Middle Meatus: Normal\par * Super Meatus: Normal\par * Sphenoethmoidal Recess: Normal\par The patient tolerated the procedure well without any complications.\par \par \par  [FreeTextEntry4] : crust right

## 2022-03-25 NOTE — REASON FOR VISIT
[Subsequent Evaluation] : a subsequent evaluation for [Nasal Obstruction] : nasal obstruction [FreeTextEntry2] : Surgical improvement

## 2022-03-25 NOTE — PHYSICAL EXAM
[Nasal Endoscopy Performed] : nasal endoscopy was performed, see procedure section for findings [Midline] : trachea located in midline position [Normal] : no rashes [FreeTextEntry1] : Nasal condition improved overall, now has some nasal congestion [de-identified] : swollen [de-identified] : Bilateral middle meatus left greater than right

## 2022-03-25 NOTE — CONSULT LETTER
[Dear  ___] : Dear  [unfilled], [Courtesy Letter:] : I had the pleasure of seeing your patient, [unfilled], in my office today. [Please see my note below.] : Please see my note below. [Sincerely,] : Sincerely, [FreeTextEntry2] :  Olga Rascon  [FreeTextEntry3] : Inocencio Hamilton MD, AURELIA, FACS\par  Department Otolaryngology\par Director of [facility]\par Professor of Otolaryngology, \par [employer] Cordero/[facility] [employer]\par

## 2022-03-25 NOTE — HISTORY OF PRESENT ILLNESS
[de-identified] : 31 year old male s/p Septoplasty, Turbinectomy and bilateral CT guided endoscopic ethmoidectomy with maxillary antrostomy with removal of Lesion, Sphenoidotomy 7/02/2020.  States breathing well, post nasal drip is much better.  States has been using Flonase daily for the past 3 weeks with good results.

## 2022-05-11 NOTE — REVIEW OF SYSTEMS
[Seen by urologist before (Name)  ___] : Preciously seen by a urologist: [unfilled] [Negative] : Heme/Lymph Orbital.../Buccal...

## 2022-12-02 ENCOUNTER — APPOINTMENT (OUTPATIENT)
Dept: OTOLARYNGOLOGY | Facility: CLINIC | Age: 32
End: 2022-12-02

## 2022-12-02 DIAGNOSIS — J34.2 DEVIATED NASAL SEPTUM: ICD-10-CM

## 2022-12-02 DIAGNOSIS — Z78.9 OTHER SPECIFIED HEALTH STATUS: ICD-10-CM

## 2022-12-02 DIAGNOSIS — Z80.8 FAMILY HISTORY OF MALIGNANT NEOPLASM OF OTHER ORGANS OR SYSTEMS: ICD-10-CM

## 2022-12-02 DIAGNOSIS — J38.3 OTHER DISEASES OF VOCAL CORDS: ICD-10-CM

## 2022-12-02 PROCEDURE — 99214 OFFICE O/P EST MOD 30 MIN: CPT | Mod: 25

## 2022-12-02 PROCEDURE — 31575 DIAGNOSTIC LARYNGOSCOPY: CPT

## 2022-12-02 RX ORDER — NABUMETONE 500 MG/1
500 TABLET, FILM COATED ORAL
Qty: 20 | Refills: 0 | Status: COMPLETED | COMMUNITY
Start: 2022-11-05

## 2022-12-02 RX ORDER — GUAIFENESIN 600 MG/1
600 TABLET, EXTENDED RELEASE ORAL
Qty: 120 | Refills: 4 | Status: ACTIVE | COMMUNITY
Start: 2022-12-02 | End: 1900-01-01

## 2022-12-02 NOTE — HISTORY OF PRESENT ILLNESS
[SMR] : submucous resection (SMR) [de-identified] : 32 year old male s/p Septoplasty, Turbinectomy and bilateral CT guided endoscopic ethmoidectomy with maxillary antrostomy with removal of Lesion, Sphenoidotomy 7/02/2020. Presents today for evaluation of his vocal cords. 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 Feels that his "vocal stamina" has decreased over the years. Occasional voice hoarseness and throat discomfort. States recently had a sore throat x 2 weeks. History of acid reflux, has taken OTC Pepcid in the past. Denies dysphagia and dyspnea. States that his breathing has been improved since nasal/ sinus surgery in 2020. No complaints of nasal congestion or sinus infections.

## 2022-12-02 NOTE — REASON FOR VISIT
[Subsequent Evaluation] : a subsequent evaluation for [Nasal Obstruction] : nasal obstruction [FreeTextEntry2] : throat evaluation.

## 2022-12-02 NOTE — PHYSICAL EXAM
[Nasal Endoscopy Performed] : nasal endoscopy was performed, see procedure section for findings [Midline] : trachea located in midline position [Normal] : no rashes [FreeTextEntry1] : Patient is experiencing increasing vocal fatigue. [de-identified] : swollen [de-identified] : Bilateral middle meatus left greater than right

## 2023-03-22 ENCOUNTER — APPOINTMENT (OUTPATIENT)
Dept: OTOLARYNGOLOGY | Facility: CLINIC | Age: 33
End: 2023-03-22
Payer: COMMERCIAL

## 2023-03-22 VITALS
WEIGHT: 150 LBS | SYSTOLIC BLOOD PRESSURE: 143 MMHG | HEIGHT: 69 IN | HEART RATE: 94 BPM | BODY MASS INDEX: 22.22 KG/M2 | DIASTOLIC BLOOD PRESSURE: 78 MMHG

## 2023-03-22 PROCEDURE — 31575 DIAGNOSTIC LARYNGOSCOPY: CPT

## 2023-03-22 PROCEDURE — 99214 OFFICE O/P EST MOD 30 MIN: CPT | Mod: 25

## 2023-03-22 NOTE — PHYSICAL EXAM
[FreeTextEntry1] : Patients voice improved after stopping antihistamines and using vocal hygiene protocol [Nasal Endoscopy Performed] : nasal endoscopy was performed, see procedure section for findings [Midline] : trachea located in midline position [Normal] : no rashes

## 2023-03-22 NOTE — HISTORY OF PRESENT ILLNESS
[de-identified] : 32 year old male follow up s/p Septoplasty, Turbinectomy and bilateral CT guided endoscopic ethmoidectomy with maxillary antrostomy with removal of Lesion, Sphenoidotomy 7/02/2020.  Follow up for vocal fatigue.  States has increased water intake, has stopped using antihistamine decongestants.  States voice has improved.

## 2023-03-22 NOTE — CONSULT LETTER
[Dear  ___] : Dear  [unfilled], [Courtesy Letter:] : I had the pleasure of seeing your patient, [unfilled], in my office today. [Please see my note below.] : Please see my note below. [Sincerely,] : Sincerely, [FreeTextEntry2] : Olga Rascon  [FreeTextEntry3] : Inocencio Hamilton MD, AURELIA, FACS\par  Department Otolaryngology\par Director of [facility]\par Professor of Otolaryngology, \par [employer]\par

## 2023-03-22 NOTE — PROCEDURE
[Image(s) Captured] : image(s) captured and filed [Video Captured] : video captured and filed [Topical Lidocaine] : topical lidocaine [Oxymetazoline HCl] : oxymetazoline HCl [Flexible Endoscope] : examined with the flexible endoscope [Serial Number: ___] : Serial Number: [unfilled] [Unable to Cooperate with Mirror] : patient unable to cooperate with mirror [Complicated Symptoms] : complicated symptoms requiring more thorough examination than provided by mirror [True Vocal Cords Paralysis] : no true vocal cord paralysis [True Vocal Cords Erythematous] : no true vocal cord edema [True Vocal Cords Kyle's Nodules] : no true vocal cord nodules [Glottis Arytenoid Cartilages] : no arytenoid ulcerations [Glottis Arytenoid Cartilages Erythema] : no arytenoid erythema [Arytenoid Edema ___ /4] : bilaterally were normal [Arytenoid Erythema ___ /4] : bilaterally were normal [Normal] : posterior cricoid area had healthy pink mucosa in the interarytenoid area and the esophageal inlet [de-identified] : Patient was placed in the examination chair in a sitting position. The nose was decongested with oxymetazoline nasal solution. The airway was anesthetized with 4% Xylocaine.  The back of the throat was anesthetized with Cetacaine. Direct flexible/rigid video endoscopy was performed. Findings revealed:\par Pt has midline septum, no more ropey secretions, larynx epiglottis and vocal cords normal. Larynx was moist.

## 2023-03-22 NOTE — REASON FOR VISIT
[Subsequent Evaluation] : a subsequent evaluation for [FreeTextEntry2] : follow up s/p Septoplasty, Turbinectomy and bilateral CT guided endoscopic ethmoidectomy with maxillary antrostomy with removal of Lesion, Sphenoidotomy 7/02/2020.

## 2024-03-20 ENCOUNTER — APPOINTMENT (OUTPATIENT)
Dept: OTOLARYNGOLOGY | Facility: CLINIC | Age: 34
End: 2024-03-20

## 2024-04-29 NOTE — ED PROVIDER NOTE - CARDIAC, MLM
Apixaban/Eliquis increases your risk for bleeding. Notify your doctor if you experience any of the following side effects: bleeding, coughing or vomiting blood, red or black stool, unexpected pain or swelling, itching or hives, chest pain, chest tightness, trouble breathing, changes in how much or how often you urinate, red or pink urine, numbness or tingling in your feet, or unusual muscle weakness. When Apixaban/Eliquis is taken with other medicines, they can affect how it works. Taking other medications such as aspirin, blood thinners, nonsteroidal anti-inflammatories, and medications that treat depression can increase your risk of bleeding. It is very important to tell your health care provider about all of the other medicines, including over-the-counter medications, herbs, and vitamins you are taking. DO NOT start, stop, or change the dosage of any medicine, including over-the-counter medicines, vitamins, and herbal products without your doctor’s approval. Any products containing aspirin or are nonsteroidal anti-inflammatories lessen the blood’s ability to form clots and add to the effect of Apixaban/Eliquis. Never take aspirin or medicines that contain aspirin without speaking to your doctor. Normal rate, regular rhythm.  Heart sounds S1, S2.  No murmurs, rubs or gallops.

## 2025-07-07 ENCOUNTER — APPOINTMENT (OUTPATIENT)
Dept: MRI IMAGING | Facility: CLINIC | Age: 35
End: 2025-07-07
Payer: COMMERCIAL

## 2025-07-07 PROCEDURE — 72141 MRI NECK SPINE W/O DYE: CPT
